# Patient Record
Sex: MALE | NOT HISPANIC OR LATINO | Employment: OTHER | URBAN - METROPOLITAN AREA
[De-identification: names, ages, dates, MRNs, and addresses within clinical notes are randomized per-mention and may not be internally consistent; named-entity substitution may affect disease eponyms.]

---

## 2018-11-01 ENCOUNTER — OCCUPATIONAL MEDICINE (OUTPATIENT)
Dept: URGENT CARE | Facility: PHYSICIAN GROUP | Age: 23
End: 2018-11-01
Payer: COMMERCIAL

## 2018-11-01 ENCOUNTER — HOSPITAL ENCOUNTER (OUTPATIENT)
Dept: RADIOLOGY | Facility: MEDICAL CENTER | Age: 23
End: 2018-11-01
Attending: PHYSICIAN ASSISTANT
Payer: COMMERCIAL

## 2018-11-01 VITALS
HEIGHT: 73 IN | BODY MASS INDEX: 29.82 KG/M2 | RESPIRATION RATE: 16 BRPM | HEART RATE: 84 BPM | OXYGEN SATURATION: 99 % | WEIGHT: 225 LBS | DIASTOLIC BLOOD PRESSURE: 80 MMHG | SYSTOLIC BLOOD PRESSURE: 120 MMHG | TEMPERATURE: 97.7 F

## 2018-11-01 DIAGNOSIS — S99.911A INJURY OF RIGHT ANKLE, INITIAL ENCOUNTER: ICD-10-CM

## 2018-11-01 DIAGNOSIS — S96.911A STRAIN OF RIGHT ANKLE, INITIAL ENCOUNTER: ICD-10-CM

## 2018-11-01 PROCEDURE — 73610 X-RAY EXAM OF ANKLE: CPT | Mod: RT

## 2018-11-01 PROCEDURE — 99203 OFFICE O/P NEW LOW 30 MIN: CPT | Mod: 29 | Performed by: PHYSICIAN ASSISTANT

## 2018-11-01 ASSESSMENT — ENCOUNTER SYMPTOMS
CHILLS: 0
TINGLING: 0
SENSORY CHANGE: 0
FOCAL WEAKNESS: 0
FEVER: 0

## 2018-11-01 ASSESSMENT — PAIN SCALES - GENERAL: PAINLEVEL: 6=MODERATE PAIN

## 2018-11-01 NOTE — PROGRESS NOTES
"Subjective:      Corey Ortiz is a 23 y.o. male who presents with Work-Related Injury (DOI 11/1/18, rolled right ankle)      DOI: 11/1/18. Patient was stepping out of his work truck while working and rolled his ankle after stepping on a rock. He suffered an inversion injury. He now complaints of moderate pain, severe with movement  Of the right ankle. He also has right ankle swelling and associated tingling and some numbness in his toes. He has history of right ankle strain about 4 years ago and a right ankle fracture over 10 years ago. He is unable to bear weight because of the pain. He has not taken anything for his symptoms.      HPI    History reviewed. No pertinent past medical history.    History reviewed. No pertinent surgical history.    History reviewed. No pertinent family history.    Allergies   Allergen Reactions   • Nkda [No Known Drug Allergy]        Medications, Allergies, and current problem list reviewed today in Epic    Review of Systems   Constitutional: Negative for chills, fever and malaise/fatigue.   Musculoskeletal: Positive for joint pain (right ankle pain ).   Skin: Negative for rash.        No laceration or abrasion   Neurological: Negative for tingling, sensory change and focal weakness.     All other systems reviewed and are negative.        Objective:     /80   Pulse 84   Temp 36.5 °C (97.7 °F) (Temporal)   Resp 16   Ht 1.854 m (6' 1\")   Wt 102.1 kg (225 lb)   SpO2 99%   BMI 29.69 kg/m²      Physical Exam   Constitutional: He is oriented to person, place, and time. He appears well-developed and well-nourished. No distress.   Eyes: Conjunctivae are normal.   Pulmonary/Chest: Effort normal. No respiratory distress.   Neurological: He is alert and oriented to person, place, and time. No cranial nerve deficit.   Psychiatric: He has a normal mood and affect. His behavior is normal. Judgment and thought content normal.       Vitals reviewed.  Right ankle: Limited ROM due to " pain. Mild TTP surrounding medial malleolus. Marked TTP over lateral malleolus and surrounding area. Moderate edema around lateral malleolus. Achilles tendon without pain and intact. Distal n/v intact. Capillary refill < 2 seconds.      11/1/2018 11:15 AM    HISTORY/REASON FOR EXAM: Injury today    TECHNIQUE/EXAM DESCRIPTION AND NUMBER OF VIEWS: 3 nonweightbearing views of the RIGHT ankle.    COMPARISON: None    FINDINGS:  There is severe lateral soft tissue swelling.    There is no acute displaced fracture. The ankle mortise is symmetric and there is no syndesmotic widening.   Impression       Severe lateral soft tissue swelling without acute displaced fracture detected.          Assessment/Plan:     1. Strain of right ankle, initial encounter    - DX-ANKLE 3+ VIEWS RIGHT; Future      Encouraged RICE, OTC NSAIDS.   CAM boot. Crutches. WBAT  RTC in 5 days.  Restrictions per D-39.     Differential diagnoses, Supportive care, and indications for immediate follow-up discussed with patient.   Instructed to return to clinic or nearest emergency department for any change in condition, further concerns, or worsening of symptoms.    The patient demonstrated a good understanding and agreed with the treatment plan.    Patricia Lincoln P.A.-C.

## 2018-11-01 NOTE — LETTER
[facility] [place]  [address] [facility] [place] [address]  Phone:  [phone] - Fax:  [phone]   [facility] Progress Report and Disability Certification  Date of Service: 11/1/2018   No Show:  No  Date / Time of Next Visit: 11/6/2018   Claim Information   Patient Name: Corey Ortiz  Claim Number:     Employer: [employer]  Date of Injury: 11/1/2018     Insurer / TPA: [employer]  ID / SSN:     Occupation: Oiler  Diagnosis: The encounter diagnosis was Strain of right ankle, initial encounter.    Medical Information   Related to Industrial Injury? Yes    Subjective Complaints:  DOI: 11/1/18. Patient was stepping out of his work truck while working and rolled his ankle after stepping on a rock. He suffered an inversion injury. He now complaints of moderate pain, severe with movement  Of the right ankle. He also has right ankle swelling and associated tingling and some numbness in his toes. He has history of right ankle strain about 4 years ago and a right ankle fracture over 10 years ago. He is unable to bear weight because of the pain. He has not taken anything for his symptoms.    Objective Findings: Vitals reviewed.  Right ankle: Limited ROM due to pain. Mild TTP surrounding medial malleolus. Marked TTP over lateral malleolus and surrounding area. Moderate edema around lateral malleolus. Achilles tendon without pain and intact. Distal n/v intact. Capillary refill < 2 seconds.   Pre-Existing Condition(s): Right ankle strain- 4 years ago. Right ankle fracture > 10 years ago.   Assessment:   Initial Visit    Status: Additional Care Required  Permanent Disability:No    Plan: Medication  Comments:RICE, OTC IBuprofen, Crutches, non-weight bearing, CAM boot    Diagnostics: X-ray  Comments:lateral soft tissue swelling, no acute fracture or dislocation    Comments:       Disability Information   Status: Released to Restricted Duty    From:  11/1/2018  Through: 11/6/2018  Restrictions are: Temporary   Physical Restrictions   Sitting:    Standing:    Stooping:    Bending:      Squatting:    Walking:    Climbing:    Pushing:      Pulling:    Other:    Reaching Above Shoulder (L):   Reaching Above Shoulder (R):       Reaching Below Shoulder (L):    Reaching Below Shoulder (R):      Not to exceed Weight Limits   Carrying(hrs):   Weight Limit(lb):   Lifting(hrs):   Weight  Limit(lb):     Comments: Patient shoulder be non-weight bearing of right leg.  Crutches and CAM boot while at work.     Repetitive Actions   Hands: i.e. Fine Manipulations from Grasping:     Feet: i.e. Operating Foot Controls: 0 hrs/day   Driving / Operate Machinery: 0 hrs/day   Physician Name: Kem Lincoln P.A.-C. Physician Signature: KEM Stapleton P.A.-C. e-Signature: Dr. Jeevan Uriarte, Medical Director   Clinic Name / Location: [address] [address] Clinic Phone Number: Dept: [phone]   Appointment Time: 10:15 Am Visit Start Time: 10:50 AM   Check-In Time:  10:44 Am Visit Discharge Time: 12.20 PM   Original-Treating Physician or Chiropractor    Page 2-Insurer/TPA    Page 3-Employer    Page 4-Employee

## 2018-11-01 NOTE — LETTER
"EMPLOYEE’S CLAIM FOR COMPENSATION/ REPORT OF INITIAL TREATMENT  FORM C-4    EMPLOYEE’S CLAIM - PROVIDE ALL INFORMATION REQUESTED   First Name  Corey Last Name  Angel Birthdate                    1995                Sex  male Claim Number   Home Address  [address] Age  23 y.o. 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 (if applicable)  climbing out of the truck    How did this injury or occupational disease occur? (Be specific an answer in detail. Use additional sheet if necessary)  I was getting out of the truck when my foot slipped and I fell landing on a rock that caused my ankle to roll   If you believe that you have an occupational disease, when did you first have knowledge of the disability and it relationship to your employment?  n/a Witnesses to the Accident  Ricardo Mathis      Nature of Injury or Occupational Disease  Sprain  Part(s) of Body Injured or Affected  Ankle (R), Ankle (R), Ankle (R)    I certify that the above is true and correct to the best of my knowledge and that I have provided this information in order " to obtain the benefits of [place]  I hereby authorize any physician, chiropractor, surgeon, practitioner, or other person, any hospital, including [facility] or [facility] hospital, any medical service organization, any insurance company, or other institution or organization to release to each other, any medical or other information, including benefits paid or payable, pertinent to this injury or disease, except information relative to diagnosis, treatment and/or counseling for AIDS, psychological conditions, alcohol or controlled substances, for which I must give specific authorization.  A Photostat of this authorization shall be as valid as the original.     Date   Place   Employee’s Signature   THIS REPORT MUST BE COMPLETED AND MAILED WITHIN 3 WORKING DAYS OF TREATMENT   Place  [facility]  Name of Facility  [place]   Date  11/1/2018 Diagnosis  (S96.911A) Strain of right ankle, initial encounter Is there evidence the injured employee was under the influence of alcohol and/or another controlled substance at the time of accident?   Hour  10:50 AM Description of Injury or Disease  The encounter diagnosis was Strain of right ankle, initial encounter. No   Treatment  Crutches, non-weight bearing right leg, CAM boot, RICE, OTC Ibuprofen, RTC in 5 days.   Have you advised the patient to remain off work five days or more? No   X-Ray Findings  Negative  Comments:lateral ankle swelling    If Yes   From Date  To Date      From information given by the employee, together with medical evidence, can you directly connect this injury or occupational disease as job incurred?  Yes If No Full Duty  No Modified Duty  Yes   Is additional medical care by a physician indicated?  Yes If Modified Duty, Specify any Limitations / Restrictions  See D-39 for restrictions    Do you know of any previous injury or  "disease contributing to this condition or occupational disease?                            Yes  Comments:right ankle strain 4 years ago. right ankle fracture > 10 years ago.   Date  11/1/2018 Print Doctor’s Name Kem Lincoln P.A.-C. I certify the employer’s copy of  this form was mailed on:   Address  [address] Insurer’s Use Only     [facility] Zip  [address]    Provider’s Tax ID Number  [id number] Telephone  Dept: [phone]        e-KEM Weiner P.A.-C.   e-Signature: Dr. Jeevan Uriarte, Medical Director Degree  PAJOCY        ORIGINAL-TREATING PHYSICIAN OR CHIROPRACTOR    PAGE 2-INSURER/TPA    PAGE 3-EMPLOYER    PAGE 4-EMPLOYEE             Form C-4 (rev10/07)              BRIEF DESCRIPTION OF RIGHTS AND BENEFITS  (Pursuant to [place]RS 616C.050)    Notice of Injury or Occupational Disease (Incident Report Form C-1): If an injury or occupational disease (OD) arises out of and in the  course of employment, you must provide written notice to your employer as soon as practicable, but no later than 7 days after the accident or  OD. Your employer shall maintain a sufficient supply of the required forms.    Claim for Compensation (Form C-4): If medical treatment is sought, the form C-4 is available at the place of initial treatment. A completed  \"Claim for Compensation\" (Form C-4) must be filed within 90 days after an accident or OD. The treating physician or chiropractor must,  within 3 working days after treatment, complete and mail to the employer, the employer's insurer and third-party , the Claim for  Compensation.    Medical Treatment: If you require medical treatment for your on-the-job injury or OD, you may be required to select a physician or  chiropractor from a list provided by your workers’ compensation insurer, if it has contracted with an Organization for Managed Care (MCO) or  Preferred Provider Organization (PPO) or providers of health care. If your employer has " not entered into a contract with an MCO or PPO, you  may select a physician or chiropractor from the Panel of Physicians and Chiropractors. Any medical costs related to your industrial injury or  OD will be paid by your insurer.    Temporary Total Disability (TTD): If your doctor has certified that you are unable to work for a period of at least 5 consecutive days, or 5  cumulative days in a 20-day period, or places restrictions on you that your employer does not accommodate, you may be entitled to TTD  compensation.    Temporary Partial Disability (TPD): If the wage you receive upon reemployment is less than the compensation for TTD to which you are  entitled, the insurer may be required to pay you TPD compensation to make up the difference. TPD can only be paid for a maximum of 24  months.    Permanent Partial Disability (PPD): When your medical condition is stable and there is an indication of a PPD as a result of your injury or  OD, within 30 days, your insurer must arrange for an evaluation by a rating physician or chiropractor to determine the degree of your PPD. The  amount of your PPD award depends on the date of injury, the results of the PPD evaluation and your age and wage.    Permanent Total Disability (PTD): If you are medically certified by a treating physician or chiropractor as permanently and totally disabled  and have been granted a PTD status by your insurer, you are entitled to receive monthly benefits not to exceed 66 2/3% of your average  monthly wage. The amount of your PTD payments is subject to reduction if you previously received a PPD award.    Vocational Rehabilitation Services: You may be eligible for vocational rehabilitation services if you are unable to return to the job due to a  permanent physical impairment or permanent restrictions as a result of your injury or occupational disease.    Transportation and Per Alyssia Reimbursement: You may be eligible for travel expenses and per alyssia  associated with medical treatment.    Reopening: You may be able to reopen your claim if your condition worsens after claim closure.    Appeal Process: If you disagree with a written determination issued by the insurer or the insurer does not respond to your request, you may  appeal to the Department of Administration, , by following the instructions contained in your determination letter. You must  appeal the determination within 70 days from the date of the determination letter at [address], [address], [place], [place]  [address], or [address], [address], [place], [place] [address]. If you disagree with the  decision, you may appeal to the  Department of Administration, . You must file your appeal within 30 days from the date of the  decision  letter at [address], [address], [place], [place] [address], or [address] [facility] [address], [place], [place] [address]. If you  disagree with a decision of an , you may file a petition for judicial review with the District Court. You must do so within 30  days of the Appeal Officer’s decision. You may be represented by an  at your own expense or you may contact the [facility] for possible  representation.    [place]  for Injured Workers (NAIW): If you disagree with a  decision, you may request that NAIW represent you  without charge at an  Hearing. For information regarding denial of benefits, you may contact the [facility] at: [address], [address], [place] [address], [phone], or [address], [address], [place] [address], [phone]    To File a Complaint with the Division: If you wish to file a complaint with the  of the Division of Industrial Relations (DIR),  please contact the Workers’ Compensation Section, [address], [address], [place], [place] [address], telephone  ([phone]) [phone], or  [address], [address], [place], [place] [address], telephone [phone]) [phone].    For assistance with Workers’ Compensation Issues: you may contact the Office of the Governor Consumer Health Assistance, [address] [place] [address], [address], [place], [place] [address], Toll Free 1-429.748.9033, Web site: http://govcha.[facility].nv., E-mail  Briana@[facility].nv.                                                                                                                                                                                                                                   __________________________________________________________________                                                                   _________________                Employee Name / Signature                                                                                                                                                       Date                                                                                                                                                                                                     D-2 (rev. 10/07)

## 2018-11-06 ENCOUNTER — OCCUPATIONAL MEDICINE (OUTPATIENT)
Dept: URGENT CARE | Facility: PHYSICIAN GROUP | Age: 23
End: 2018-11-06
Payer: COMMERCIAL

## 2018-11-06 VITALS
RESPIRATION RATE: 16 BRPM | HEART RATE: 89 BPM | TEMPERATURE: 98.8 F | WEIGHT: 225 LBS | OXYGEN SATURATION: 96 % | DIASTOLIC BLOOD PRESSURE: 92 MMHG | SYSTOLIC BLOOD PRESSURE: 140 MMHG | BODY MASS INDEX: 29.82 KG/M2 | HEIGHT: 73 IN

## 2018-11-06 DIAGNOSIS — S93.411D SPRAIN OF CALCANEOFIBULAR LIGAMENT OF RIGHT ANKLE, SUBSEQUENT ENCOUNTER: ICD-10-CM

## 2018-11-06 PROCEDURE — 99213 OFFICE O/P EST LOW 20 MIN: CPT | Performed by: EMERGENCY MEDICINE

## 2018-11-06 ASSESSMENT — ENCOUNTER SYMPTOMS
FALLS: 1
NERVOUS/ANXIOUS: 0
VOMITING: 0
CHILLS: 0
FEVER: 0
NAUSEA: 0
SENSORY CHANGE: 0
EYE REDNESS: 0
SPEECH CHANGE: 0
COUGH: 0
EYE DISCHARGE: 0

## 2018-11-06 NOTE — LETTER
[facility] [place]  [address] [facility] [place] [address]  Phone:  [phone] - Fax:  [phone]   [facility] Progress Report and Disability Certification  Date of Service: 11/6/2018   No Show:  No  Date / Time of Next Visit: 11/10/2018   Claim Information   Patient Name: Corey Ortiz  Claim Number:     Employer: [employer]  Date of Injury: 11/1/2018     Insurer / TPA: [employer]  ID / SSN:     Occupation: Oiler  Diagnosis: The encounter diagnosis was Sprain of calcaneofibular ligament of right ankle, subsequent encounter.    Medical Information   Related to Industrial Injury? Yes    Subjective Complaints:  Date of injury 11/1/2018.  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  Patient was initially seen that day in urgent care with soft tissue swelling on x-ray no fracture dislocation placed in a walking boot and doing limited work for the past 5 days.  States that he is approximately 15% improved.  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   Objective Findings: Patient's vital signs blood pressure 140/90, pulse of 89 and regular respiratory rate 16 with a temperature of 37.1 pulse oximetry of 96%.  Patient is currently walking with a walking boot and crutches.  Very tender over his calcaneal fibular ligament of his lateral ankle no proximal leg tenderness swelling appears to have diminished.   Pre-Existing Condition(s): Patient had an ankle strain 4 years ago and a right ankle fracture 10 years ago.   Assessment:   Condition Improved    Status: Additional Care Required  Permanent Disability:  Comments:Unknown    Plan:   Comments:Patient will continue crutch walking, light duty using anti-inflammatories as needed basis.  Warm soaks elevation will continue.    Diagnostics:   Comments:Previous x-rays soft tissue swelling only    Comments:        Disability Information   Status: Released to Restricted Duty    From:  2018  Through: 11/10/2018 Restrictions are: Temporary   Physical Restrictions   Sitting:    Standing:    Comments:Patient will continue crutch walking with walking boot in place on the right. Stooping:    Bending:      Squatting:    Walking:    Comments:Patient will be crutch walking with partial weightbearing on the right ankle/foot. Climbin hrs/day Pushing:      Pulling:    Other:    Reaching Above Shoulder (L):   Reaching Above Shoulder (R):       Reaching Below Shoulder (L):    Reaching Below Shoulder (R):      Not to exceed Weight Limits   Carrying(hrs): 2 Weight Limit(lb): < or = to 10 pounds  Comments:may use a tote bag / back pack with crutches  Lifting(hrs): 4 Weight  Limit(lb): < or = to 10 pounds   Comments: Patient will continue to advance crutch walking with partial weightbearing.  To be reevaluated in 4 days.    Repetitive Actions   Hands: i.e. Fine Manipulations from Grasping:     Feet: i.e. Operating Foot Controls:     Driving / Operate Machinery:     Physician Name: Nathan Coreas M.D. Physician Signature:   e-Signature: Dr. Jeevan Uriarte, Medical Director   Clinic Name / Location: [address] [address] Clinic Phone Number: Dept: [phone]   Appointment Time: 8:40 Am Visit Start Time: 8:22 AM   Check-In Time:  8:15 Am Visit Discharge Time:  9:15 AM    Original-Treating Physician or Chiropractor    Page 2-Insurer/TPA    Page 3-Employer    Page 4-Employee

## 2018-11-06 NOTE — PROGRESS NOTES
Subjective:      Corey Ortiz is a 23 y.o. male who presents with Work-Related Injury (Fv R ankle injury; DOI 11/1/2018)      Date of injury 11/1/2018.  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  Patient was initially seen that day in urgent care with soft tissue swelling on x-ray no fracture dislocation placed in a walking boot and doing limited work for the past 5 days.  States that he is approximately 15% improved.  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     HPI  PMH:  has no past medical history of Diabetes.  MEDS:   Current Outpatient Prescriptions:   •  Pseudoephedrine-APAP-DM (DAYQUIL PO), Take  by mouth., Disp: , Rfl:   •  azithromycin (ZITHROMAX) 250 MG TABS, 2 tabs by mouth day 1, 1 tab by mouth days 2-5 (Patient not taking: Reported on 11/6/2018), Disp: 6 Tab, Rfl: 0  •  hydrocod polst-chlorphen polst (TUSSIONEX) 10-8 MG/5ML LQCR, Take 5 mL by mouth every 12 hours. (Patient not taking: Reported on 11/6/2018), Disp: 140 mL, Rfl: 0  •  hydrocodone-acetaminophen (VICODIN) 5-500 MG TABS, Take 1-2 Tabs by mouth every 6 hours as needed., Disp: 15 Each, Rfl: 0  •  mupirocin (BACTROBAN) 2 % OINT, Apply 1 Application to affected area(s) 2 times a day., Disp: 1 Tube, Rfl: 0  ALLERGIES:   Allergies   Allergen Reactions   • Nkda [No Known Drug Allergy]      SURGHX: No past surgical history on file.  SOCHX:  reports that he has never smoked. He has never used smokeless tobacco. He reports that he does not drink alcohol or use drugs.  FH: family history is not on file.  Review of Systems   Constitutional: Negative for chills and fever.   Eyes: Negative for discharge and redness.   Respiratory: Negative for cough.    Cardiovascular: Negative for chest pain.   Gastrointestinal: Negative for nausea and vomiting.   Musculoskeletal: Positive for falls and joint pain.   Skin: Negative for  "rash.   Neurological: Negative for sensory change and speech change.   Psychiatric/Behavioral: The patient is not nervous/anxious.           Objective:     /92   Pulse 89   Temp 37.1 °C (98.8 °F) (Temporal)   Resp 16   Ht 1.854 m (6' 1\")   Wt 102.1 kg (225 lb)   SpO2 96%   BMI 29.69 kg/m²      Physical Exam    Patient's vital signs blood pressure 140/90, pulse of 89 and regular respiratory rate 16 with a temperature of 37.1 pulse oximetry of 96%.  Patient is currently walking with a walking boot and crutches.  Very tender over his calcaneal fibular ligament of his lateral ankle no proximal leg tenderness swelling appears to have diminished.       Assessment/Plan:     1. Sprain of calcaneofibular ligament of right ankle, subsequent encounter      Please refer to the D-39 form      "

## 2018-11-10 ENCOUNTER — OCCUPATIONAL MEDICINE (OUTPATIENT)
Dept: URGENT CARE | Facility: PHYSICIAN GROUP | Age: 23
End: 2018-11-10
Payer: COMMERCIAL

## 2018-11-10 VITALS
SYSTOLIC BLOOD PRESSURE: 120 MMHG | TEMPERATURE: 98.8 F | HEART RATE: 81 BPM | BODY MASS INDEX: 29.82 KG/M2 | DIASTOLIC BLOOD PRESSURE: 68 MMHG | WEIGHT: 225 LBS | HEIGHT: 73 IN | OXYGEN SATURATION: 96 %

## 2018-11-10 DIAGNOSIS — S93.401D SPRAIN OF RIGHT ANKLE, UNSPECIFIED LIGAMENT, SUBSEQUENT ENCOUNTER: ICD-10-CM

## 2018-11-10 PROCEDURE — 99213 OFFICE O/P EST LOW 20 MIN: CPT | Mod: 29 | Performed by: PHYSICIAN ASSISTANT

## 2018-11-10 NOTE — PROGRESS NOTES
"Subjective:      Corey Ortiz is a 23 y.o. male who presents with Work-Related Injury (W/C FV// R ankle// sore feeling// starting walking yesterday with just the boot// )      DOI: 11/1/18. Patient was stepping out of his work truck while working and rolled his ankle after stepping on a rock. He suffered an inversion injury.  He returns to clinic stating he has had approximately 70% improvement.  He states he has been walking without crutches but still using the walking boot.  He feels he could increase his weight restrictions to 25 pounds.  He has been working on light/office duty at work.  He has history of right ankle strain about 4 years ago and a right ankle fracture over 10 years ago.      HPI    ROS       Objective:     /68 (BP Location: Left arm, Patient Position: Sitting)   Pulse 81   Temp 37.1 °C (98.8 °F) (Temporal)   Ht 1.854 m (6' 1\")   Wt 102.1 kg (225 lb)   SpO2 96%   BMI 29.69 kg/m²      Physical Exam    Gen: AOx3; Head: NC AT; Eyes: PERRLA/EOM; Lungs: NLR; Cardiac: RR by periph pulse exam; right ankle: Mild but improving ankle effusion, no erythema, trace ecchymosis and dependent position medially and laterally, mild tenderness to palpation about medial as well as lateral malleoli, limited active range of motion in flexion and extension secondary to pain and swelling, stable with varus and valgus stress; neuro: N VID normal sensation to light touch brisk capillary refill, antalgic gait (with boot on)       xrays NEG for fx (prior visit)    Assessment/Plan:     1. Sprain of right ankle, unspecified ligament, subsequent encounter  25 pound weight carrying restriction, weightbearing in boot without crutches while at work, OTC NSAIDs, ice elevation, work on range of motion while elevating, follow-up in 1 week      "

## 2018-11-10 NOTE — LETTER
[facility] [place] [address] [address] [facility] [place] [address]  Phone:  [phone] - Fax:  [phone]   [facility] Progress Report and Disability Certification  Date of Service: 11/10/2018   No Show:  No  Date / Time of Next Visit: 11/17/2018   Claim Information   Patient Name: Corey Ortiz  Claim Number:     Employer: [employer]  Date of Injury: 11/1/2018     Insurer / TPA: [employer]  ID / SSN:     Occupation: Oiler  Diagnosis: The encounter diagnosis was Sprain of right ankle, unspecified ligament, subsequent encounter.    Medical Information   Related to Industrial Injury? Yes    Subjective Complaints:  DOI: 11/1/18. Patient was stepping out of his work truck while working and rolled his ankle after stepping on a rock. He suffered an inversion injury.  He returns to clinic stating he has had approximately 70% improvement.  He states he has been walking without crutches but still using the walking boot.  He feels he could increase his weight restrictions to 25 pounds.  He has been working on light/office duty at work.  He has history of right ankle strain about 4 years ago and a right ankle fracture over 10 years ago.    Objective Findings: Gen: AOx3; Head: NC AT; Eyes: PERRLA/EOM; Lungs: NLR; Cardiac: RR by periph pulse exam; right ankle: Mild but improving ankle effusion, no erythema, trace ecchymosis and dependent position medially and laterally, mild tenderness to palpation about medial as well as lateral malleoli, limited active range of motion in flexion and extension secondary to pain and swelling, stable with varus and valgus stress; neuro: N VID normal sensation to light touch brisk capillary refill, antalgic gait (with boot on)     Pre-Existing Condition(s):     Assessment:   Condition Improved    Status: Additional Care Required  Permanent Disability:No    Plan:   Comments:25 pound weight carrying restriction, weightbearing in boot without crutches while  at work, OTC NSAIDs, ice elevation, work on range of motion while elevating, follow-up in 1 week     Diagnostics: X-ray  Comments:NEG for fx    Comments:       Disability Information   Status: Released to Restricted Duty    From:  11/10/2018  Through: 11/17/2018 Restrictions are: Temporary   Physical Restrictions   Sitting:    Standing:    Stooping:    Bending:      Squatting:    Walking:  < or = to 2 hrs/day Climbing:    Pushing:      Pulling:    Other:    Reaching Above Shoulder (L):   Reaching Above Shoulder (R):       Reaching Below Shoulder (L):    Reaching Below Shoulder (R):      Not to exceed Weight Limits   Carrying(hrs):   Weight Limit(lb): < or = to 25 pounds Lifting(hrs):   Weight  Limit(lb): < or = to 25 pounds   Comments: 25 pound weight carrying restriction, weightbearing in boot without crutches while at work, OTC NSAIDs, ice elevation, work on range of motion while elevating, follow-up in 1 week    Repetitive Actions   Hands: i.e. Fine Manipulations from Grasping:     Feet: i.e. Operating Foot Controls:     Driving / Operate Machinery:     Physician Name: Osmar Cantrell P.A.-C. Physician Signature: OSMAR Guerrero P.A.-C. e-Signature: Dr. Jeevan Uriarte, Medical Director   Clinic Name / Location: [address] [address] Clinic Phone Number: Dept: [phone]   Appointment Time: 9:00 Am Visit Start Time: 9:05 AM   Check-In Time:  8:58 Am Visit Discharge Time:  9:50 AM   Original-Treating Physician or Chiropractor    Page 2-Insurer/TPA    Page 3-Employer    Page 4-Employee

## 2018-11-17 ENCOUNTER — OCCUPATIONAL MEDICINE (OUTPATIENT)
Dept: URGENT CARE | Facility: PHYSICIAN GROUP | Age: 23
End: 2018-11-17
Payer: COMMERCIAL

## 2018-11-17 VITALS
WEIGHT: 225 LBS | DIASTOLIC BLOOD PRESSURE: 80 MMHG | HEART RATE: 96 BPM | RESPIRATION RATE: 14 BRPM | HEIGHT: 73 IN | BODY MASS INDEX: 29.82 KG/M2 | TEMPERATURE: 98.8 F | OXYGEN SATURATION: 95 % | SYSTOLIC BLOOD PRESSURE: 122 MMHG

## 2018-11-17 DIAGNOSIS — S93.401D SPRAIN OF RIGHT ANKLE, UNSPECIFIED LIGAMENT, SUBSEQUENT ENCOUNTER: ICD-10-CM

## 2018-11-17 PROCEDURE — 99213 OFFICE O/P EST LOW 20 MIN: CPT | Mod: 29 | Performed by: PHYSICIAN ASSISTANT

## 2018-11-17 ASSESSMENT — ENCOUNTER SYMPTOMS
FEVER: 0
SHORTNESS OF BREATH: 0
DIZZINESS: 0
CHILLS: 0
ABDOMINAL PAIN: 0
DIARRHEA: 0
VOMITING: 0
NAUSEA: 0

## 2018-11-17 NOTE — LETTER
[facility] [place] [address] [address] [facility] [place] [address]  Phone:  [phone] - Fax:  [phone]   [facility] Progress Report and Disability Certification  Date of Service: 11/17/2018   No Show:  No  Date / Time of Next Visit: 11/24/2018   Claim Information   Patient Name: Corey Ortiz  Claim Number:     Employer: [employer]  Date of Injury: 11/1/2018     Insurer / TPA:  [employer] Claims ID / SSN:     Occupation: Oiler  Diagnosis: The encounter diagnosis was Sprain of right ankle, unspecified ligament, subsequent encounter.    Medical Information   Related to Industrial Injury? Yes    Subjective Complaints:   DOI: 11/1/18. Patient was stepping out of his work truck while working and rolled his ankle after stepping on a rock. He suffered an inversion injury.  He returns to clinic reporting improvement in the pain and ROM. Only slight residual swelling of the area without ecchymosis.  He states he has been walking without crutches but still using the walking boot.  He has been working on light/office duty at work.  He has history of right ankle strain about 4 years ago and a right ankle fracture over 10 years ago.  He is no longer taking any OTC medications for the pain.   Objective Findings: Musculoskeletal: Normal range of motion.        Left ankle: He exhibits swelling. He exhibits normal range of motion and no ecchymosis. No tenderness. No lateral malleolus, no medial malleolus and no head of 5th metatarsal tenderness found.   Very slight edema over anterior/lateral aspect of right ankle. No erythema or ecchymosis noted.     Pre-Existing Condition(s): Prior right ankle sprain, prior right ankle fracture   Assessment:   Condition Improved    Status: Additional Care Required  Permanent Disability:No    Plan:      Diagnostics: X-ray  Comments:negative    Comments:       Disability Information   Status: Released to Full Duty    From:  11/17/2018  Through: 11/24/2018  Restrictions are:     Physical Restrictions   Sitting:    Standing:    Stooping:    Bending:      Squatting:    Walking:    Climbing:    Pushing:      Pulling:    Other:    Reaching Above Shoulder (L):   Reaching Above Shoulder (R):       Reaching Below Shoulder (L):    Reaching Below Shoulder (R):      Not to exceed Weight Limits   Carrying(hrs):   Weight Limit(lb):   Lifting(hrs):   Weight  Limit(lb):     Comments: Trial of full duty  Encouraged to wear ankle brace under steel toe boots while at work  RTC in 1 week for follow up, expect discharge at that time    Repetitive Actions   Hands: i.e. Fine Manipulations from Grasping:     Feet: i.e. Operating Foot Controls:     Driving / Operate Machinery:     Physician Name: Melissa Kinney P.A.-C. Physician Signature: MELISSA Santana P.A.-C. e-Signature: Dr. Jeevan Uriarte, Medical Director   Clinic Name / Location: [address] [address] Clinic Phone Number: Dept: [phone]   Appointment Time: 9:20 Am Visit Start Time: 9:12 AM   Check-In Time:  9:05 Am Visit Discharge Time:  10:08 AM   Original-Treating Physician or Chiropractor    Page 2-Insurer/TPA    Page 3-Employer    Page 4-Employee

## 2018-11-17 NOTE — PROGRESS NOTES
"Subjective:      Corey Ortiz is a 23 y.o. male who presents with Follow-Up (wc fv on right ankle is getting better )       DOI: 11/1/18. Patient was stepping out of his work truck while working and rolled his ankle after stepping on a rock. He suffered an inversion injury.  He returns to clinic reporting improvement in the pain and ROM. Only slight residual swelling of the area without ecchymosis.  He states he has been walking without crutches but still using the walking boot.  He has been working on light/office duty at work.  He has history of right ankle strain about 4 years ago and a right ankle fracture over 10 years ago.  He is no longer taking any OTC medications for the pain.     HPI    Review of Systems   Constitutional: Negative for chills and fever.   HENT: Negative for congestion.    Respiratory: Negative for shortness of breath.    Cardiovascular: Negative for chest pain.   Gastrointestinal: Negative for abdominal pain, diarrhea, nausea and vomiting.   Genitourinary: Negative.    Musculoskeletal:        + right ankle pain   Skin: Negative for rash.   Neurological: Negative for dizziness.          Objective:     /80   Pulse 96   Temp 37.1 °C (98.8 °F) (Temporal)   Resp 14   Ht 1.854 m (6' 1\")   Wt 102.1 kg (225 lb)   SpO2 95%   BMI 29.69 kg/m²      Physical Exam   Constitutional: He is oriented to person, place, and time. He appears well-developed and well-nourished. No distress.   HENT:   Head: Normocephalic and atraumatic.   Eyes: Pupils are equal, round, and reactive to light.   Neck: Normal range of motion.   Cardiovascular: Normal rate.    Pulmonary/Chest: Effort normal.   Musculoskeletal: Normal range of motion.        Left ankle: He exhibits swelling. He exhibits normal range of motion and no ecchymosis. No tenderness. No lateral malleolus, no medial malleolus and no head of 5th metatarsal tenderness found.   Very slight edema over anterior/lateral aspect of right ankle. No " erythema or ecchymosis noted.    Neurological: He is alert and oriented to person, place, and time.   Skin: Skin is warm and dry. He is not diaphoretic.   Psychiatric: He has a normal mood and affect. His behavior is normal.   Nursing note and vitals reviewed.              PMH:  has no past medical history of Diabetes.  MEDS:   Current Outpatient Prescriptions:   •  Pseudoephedrine-APAP-DM (DAYQUIL PO), Take  by mouth., Disp: , Rfl:   •  azithromycin (ZITHROMAX) 250 MG TABS, 2 tabs by mouth day 1, 1 tab by mouth days 2-5 (Patient not taking: Reported on 11/6/2018), Disp: 6 Tab, Rfl: 0  •  hydrocod polst-chlorphen polst (TUSSIONEX) 10-8 MG/5ML LQCR, Take 5 mL by mouth every 12 hours. (Patient not taking: Reported on 11/6/2018), Disp: 140 mL, Rfl: 0  •  hydrocodone-acetaminophen (VICODIN) 5-500 MG TABS, Take 1-2 Tabs by mouth every 6 hours as needed., Disp: 15 Each, Rfl: 0  •  mupirocin (BACTROBAN) 2 % OINT, Apply 1 Application to affected area(s) 2 times a day., Disp: 1 Tube, Rfl: 0  ALLERGIES:   Allergies   Allergen Reactions   • Nkda [No Known Drug Allergy]      SURGHX: History reviewed. No pertinent surgical history.  SOCHX:  reports that he has never smoked. He has never used smokeless tobacco. He reports that he does not drink alcohol or use drugs.  FH: family history is not on file.    Assessment/Plan:     1. Sprain of right ankle, unspecified ligament, subsequent encounter    Trial of full duty  Encouraged to wear ankle brace under steel toe boots while at work  RTC in 1 week for follow up, expect discharge at that time

## 2019-11-05 ENCOUNTER — OFFICE VISIT (OUTPATIENT)
Dept: URGENT CARE | Facility: CLINIC | Age: 24
End: 2019-11-05

## 2019-11-05 VITALS
HEIGHT: 73 IN | BODY MASS INDEX: 30.75 KG/M2 | OXYGEN SATURATION: 96 % | WEIGHT: 232 LBS | TEMPERATURE: 98.9 F | RESPIRATION RATE: 16 BRPM | HEART RATE: 101 BPM | SYSTOLIC BLOOD PRESSURE: 118 MMHG | DIASTOLIC BLOOD PRESSURE: 78 MMHG

## 2019-11-05 DIAGNOSIS — J22 LRTI (LOWER RESPIRATORY TRACT INFECTION): ICD-10-CM

## 2019-11-05 DIAGNOSIS — R05.9 COUGH: ICD-10-CM

## 2019-11-05 DIAGNOSIS — J98.01 BRONCHOSPASM: ICD-10-CM

## 2019-11-05 PROCEDURE — 99214 OFFICE O/P EST MOD 30 MIN: CPT | Performed by: PHYSICIAN ASSISTANT

## 2019-11-05 RX ORDER — BENZONATATE 100 MG/1
100 CAPSULE ORAL 3 TIMES DAILY PRN
Qty: 60 CAP | Refills: 0 | Status: SHIPPED | OUTPATIENT
Start: 2019-11-05 | End: 2023-02-01

## 2019-11-05 RX ORDER — AZITHROMYCIN 250 MG/1
TABLET, FILM COATED ORAL
Qty: 6 TAB | Refills: 0 | Status: SHIPPED | OUTPATIENT
Start: 2019-11-05 | End: 2023-02-01

## 2019-11-05 RX ORDER — METHYLPREDNISOLONE 4 MG/1
TABLET ORAL
Qty: 21 TAB | Refills: 0 | Status: SHIPPED | OUTPATIENT
Start: 2019-11-05 | End: 2023-02-01

## 2019-11-05 ASSESSMENT — ENCOUNTER SYMPTOMS
FEVER: 0
SHORTNESS OF BREATH: 0
COUGH: 1
DIARRHEA: 0
MYALGIAS: 1
CHILLS: 1
WHEEZING: 1
SPUTUM PRODUCTION: 0
SORE THROAT: 0
ABDOMINAL PAIN: 0
VOMITING: 0
NAUSEA: 0

## 2019-11-05 NOTE — LETTER
November 5, 2019       Patient: Corey Ortiz   YOB: 1995   Date of Visit: 11/5/2019         To Whom It May Concern:    It is my medical opinion that Corey Ortiz should be excused from work for tomorrow due to illness.      If you have any questions or concerns, please don't hesitate to call [phone]          Sincerely,          Osmar Cantrell P.A.-C.  Electronically Signed

## 2019-11-06 NOTE — PROGRESS NOTES
"Subjective:   Corey Ortiz  is a 24 y.o. male who presents for Cough (x3days, cough, chest congestion, sob when walking, fatigue, body aches)        Cough   This is a new problem. The current episode started in the past 7 days. Associated symptoms include chills, myalgias and wheezing. Pertinent negatives include no ear pain, fever, rash, sore throat or shortness of breath. His past medical history is significant for environmental allergies.     Patient comes clinic describing last 3 to 4 days of cough is worsening more chest congestion at this time.  He notes cough is very spastic at this time has had a few episodes of near posttussive emesis.  Denies noting fever but has had body aches and chills suspects a subjective fever.  Denies ear pain or sore throat but notes some sinus pressure.  Notes wheezy coughing from his chest that worsens with activity.  He notes past medical history of bronchitis he was told he was \"nearly pneumonia\".  Has tried over-the-counter cough suppressants which did allow him to sleep adequately the other night.  Denies past medical history of asthma but has heard wheezing.  Denies treatment for seasonal allergies but notes some presence of them in the past.    Review of Systems   Constitutional: Positive for chills. Negative for fever.   HENT: Positive for congestion. Negative for ear pain and sore throat.    Respiratory: Positive for cough and wheezing. Negative for sputum production and shortness of breath.    Gastrointestinal: Negative for abdominal pain, diarrhea, nausea and vomiting.   Musculoskeletal: Positive for myalgias.   Skin: Negative for rash.   Endo/Heme/Allergies: Positive for environmental allergies.     Allergies   Allergen Reactions   • Nkda [No Known Drug Allergy]       Objective:   /78 (BP Location: Left arm, Patient Position: Sitting, BP Cuff Size: Adult)   Pulse (!) 101   Temp 37.2 °C (98.9 °F) (Temporal)   Resp 16   Ht 1.854 m (6' 1\")   Wt 105.2 kg (232 " lb)   SpO2 96%   BMI 30.61 kg/m²   Physical Exam  Vitals signs and nursing note reviewed.   Constitutional:       General: He is not in acute distress.     Appearance: He is well-developed. He is not diaphoretic.   HENT:      Head: Normocephalic and atraumatic.      Right Ear: Tympanic membrane, ear canal and external ear normal.      Left Ear: Tympanic membrane, ear canal and external ear normal.      Nose: Nose normal.      Mouth/Throat:      Pharynx: Uvula midline. Posterior oropharyngeal erythema ( mild PND) present. No oropharyngeal exudate.      Tonsils: No tonsillar abscesses.   Eyes:      General: No scleral icterus.        Right eye: No discharge.         Left eye: No discharge.      Conjunctiva/sclera: Conjunctivae normal.   Neck:      Musculoskeletal: Neck supple.   Pulmonary:      Effort: Pulmonary effort is normal. No accessory muscle usage or respiratory distress.      Breath sounds: Wheezing and rhonchi (mild ) present. No decreased breath sounds or rales.   Musculoskeletal: Normal range of motion.   Lymphadenopathy:      Cervical: Cervical adenopathy ( mild bilat) present.   Skin:     General: Skin is warm and dry.      Coloration: Skin is not pale.   Neurological:      Mental Status: He is alert and oriented to person, place, and time.      Coordination: Coordination normal.           Assessment/Plan:   1. LRTI (lower respiratory tract infection)  - azithromycin (ZITHROMAX) 250 MG Tab; Take as directed on package. Dispense one package.  Dispense: 6 Tab; Refill: 0    2. Cough  - benzonatate (TESSALON) 100 MG Cap; Take 1 Cap by mouth 3 times a day as needed for Cough.  Dispense: 60 Cap; Refill: 0    3. Bronchospasm  - methylPREDNISolone (MEDROL DOSEPAK) 4 MG Tablet Therapy Pack; Follow schedule on package instructions.  Dispense: 21 Tab; Refill: 0  Supportive care is reviewed with patient/caregiver - recommend to push PO fluids and electrolytes, Nsaids/tylenol, netti pot/saline irrig, humidifier in  home, flonase, ponaris, antihistamine, Cautioned regarding potential side effects of steroid, avoid nsaids while using   take full course of Rx, take with probiotics, observe for resolution  Return to clinic with lack of resolution or progression of symptoms.    Differential diagnosis, natural history, supportive care, and indications for immediate follow-up discussed.

## 2020-05-20 ENCOUNTER — HOSPITAL ENCOUNTER (OUTPATIENT)
Facility: MEDICAL CENTER | Age: 25
End: 2020-05-20
Payer: COMMERCIAL

## 2020-05-23 LAB
SARS-COV-2 RNA SPEC QL NAA+PROBE: NOT DETECTED
SPECIMEN SOURCE: NORMAL

## 2021-02-23 ENCOUNTER — HOSPITAL ENCOUNTER (EMERGENCY)
Dept: HOSPITAL 8 - ED | Age: 26
End: 2021-02-23
Payer: COMMERCIAL

## 2021-02-23 VITALS — SYSTOLIC BLOOD PRESSURE: 131 MMHG | DIASTOLIC BLOOD PRESSURE: 78 MMHG

## 2021-02-23 VITALS — HEIGHT: 73 IN | WEIGHT: 230.38 LBS | BODY MASS INDEX: 30.53 KG/M2

## 2021-02-23 DIAGNOSIS — R55: Primary | ICD-10-CM

## 2021-02-23 DIAGNOSIS — R94.31: ICD-10-CM

## 2021-02-23 LAB
ANION GAP SERPL CALC-SCNC: 9 MMOL/L (ref 5–15)
BASOPHILS # BLD AUTO: 0.1 X10^3/UL (ref 0–0.1)
BASOPHILS NFR BLD AUTO: 1 % (ref 0–1)
CALCIUM SERPL-MCNC: 9.2 MG/DL (ref 8.5–10.1)
CHLORIDE SERPL-SCNC: 111 MMOL/L (ref 98–107)
CREAT SERPL-MCNC: 0.99 MG/DL (ref 0.7–1.3)
EOSINOPHIL # BLD AUTO: 0.6 X10^3/UL (ref 0–0.4)
EOSINOPHIL NFR BLD AUTO: 5 % (ref 1–7)
ERYTHROCYTE [DISTWIDTH] IN BLOOD BY AUTOMATED COUNT: 12.8 % (ref 9.4–14.8)
LYMPHOCYTES # BLD AUTO: 2.8 X10^3/UL (ref 1–3.4)
LYMPHOCYTES NFR BLD AUTO: 25 % (ref 22–44)
MCH RBC QN AUTO: 31.9 PG (ref 27.5–34.5)
MCHC RBC AUTO-ENTMCNC: 36 G/DL (ref 33.2–36.2)
MD: (no result)
MONOCYTES # BLD AUTO: 0.8 X10^3/UL (ref 0.2–0.8)
MONOCYTES NFR BLD AUTO: 7 % (ref 2–9)
NEUTROPHILS # BLD AUTO: 7.2 X10^3/UL (ref 1.8–6.8)
NEUTROPHILS NFR BLD AUTO: 62 % (ref 42–75)
PLATELET # BLD AUTO: 403 X10^3/UL (ref 130–400)
PMV BLD AUTO: 7.4 FL (ref 7.4–10.4)
RBC # BLD AUTO: 5.17 X10^6/UL (ref 4.38–5.82)

## 2021-02-23 PROCEDURE — 36415 COLL VENOUS BLD VENIPUNCTURE: CPT

## 2021-02-23 PROCEDURE — 80320 DRUG SCREEN QUANTALCOHOLS: CPT

## 2021-02-23 PROCEDURE — 93005 ELECTROCARDIOGRAM TRACING: CPT

## 2021-02-23 PROCEDURE — 85025 COMPLETE CBC W/AUTO DIFF WBC: CPT

## 2021-02-23 PROCEDURE — G0480 DRUG TEST DEF 1-7 CLASSES: HCPCS

## 2021-02-23 PROCEDURE — 99285 EMERGENCY DEPT VISIT HI MDM: CPT

## 2021-02-23 PROCEDURE — 80048 BASIC METABOLIC PNL TOTAL CA: CPT

## 2021-02-23 PROCEDURE — 83735 ASSAY OF MAGNESIUM: CPT

## 2021-02-23 PROCEDURE — 80307 DRUG TEST PRSMV CHEM ANLYZR: CPT

## 2021-02-23 NOTE — NUR
BREAK RN: PT OOB AND AMBULATED TO BATHROOM, UPRIGHT STEADY GAIT.  URNIE SAMPLE 
COLLECTED AND SENT TO LAB.  PT RTD TO ROOM W/O INCIDENT.  ALL MONITORS PLACED 
AND CALL LIGHT W/I REACH

## 2021-02-23 NOTE — NUR
PT REC'VD DISCHARGE INSTRUCTIONS AND EDUCATION. PT AND SPOUSE HAD NO QUESTIONS. 
PT AMBULATED TO DC AREA, STEADY GAIT

## 2023-02-01 ENCOUNTER — OFFICE VISIT (OUTPATIENT)
Dept: URGENT CARE | Facility: PHYSICIAN GROUP | Age: 28
End: 2023-02-01
Payer: COMMERCIAL

## 2023-02-01 ENCOUNTER — HOSPITAL ENCOUNTER (OUTPATIENT)
Dept: RADIOLOGY | Facility: MEDICAL CENTER | Age: 28
End: 2023-02-01
Attending: PHYSICIAN ASSISTANT
Payer: COMMERCIAL

## 2023-02-01 VITALS
RESPIRATION RATE: 16 BRPM | SYSTOLIC BLOOD PRESSURE: 100 MMHG | HEIGHT: 73 IN | WEIGHT: 239 LBS | OXYGEN SATURATION: 96 % | TEMPERATURE: 98.2 F | DIASTOLIC BLOOD PRESSURE: 62 MMHG | BODY MASS INDEX: 31.68 KG/M2 | HEART RATE: 83 BPM

## 2023-02-01 DIAGNOSIS — S30.0XXA COCCYX CONTUSION, INITIAL ENCOUNTER: ICD-10-CM

## 2023-02-01 DIAGNOSIS — S39.012A LUMBAR STRAIN, INITIAL ENCOUNTER: ICD-10-CM
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
## 2023-02-01 PROCEDURE — 99203 OFFICE O/P NEW LOW 30 MIN: CPT | Performed by: PHYSICIAN ASSISTANT

## 2023-02-01 PROCEDURE — 72220 X-RAY EXAM SACRUM TAILBONE: CPT

## 2023-02-01 RX ORDER — IBUPROFEN 800 MG/1
800 TABLET ORAL EVERY 8 HOURS PRN
Qty: 30 TABLET | Refills: 0 | Status: SHIPPED | OUTPATIENT
Start: 2023-02-01

## 2023-02-01 RX ORDER — CYCLOBENZAPRINE HCL 10 MG
10 TABLET ORAL 3 TIMES DAILY PRN
Qty: 30 TABLET | Refills: 0 | Status: SHIPPED | OUTPATIENT
Start: 2023-02-01

## 2023-02-01 ASSESSMENT — ENCOUNTER SYMPTOMS
BRUISES/BLEEDS EASILY: 0
BACK PAIN: 1
LOSS OF CONSCIOUSNESS: 0
HEADACHES: 0
WEAKNESS: 0
FALLS: 1
MYALGIAS: 1
TINGLING: 0
DIZZINESS: 0

## 2023-02-01 NOTE — PROGRESS NOTES
Subjective:     CHIEF COMPLAINT  Chief Complaint   Patient presents with    Tailbone Pain     Slipped and fell on  ice yesterday , now having tail bone pain        HPI  Laz Ortiz is a very pleasant 28 y.o. male who presents to clinic after sustaining a ground-level fall yesterday.  Patient was carrying boxes downstairs when he slipped on ice falling onto his tailbone.  He has been experiencing pain since this event.  Pain is predominantly over the area of the coccyx.  Pain is aggravated with bending and squatting.  Denies any bruising or discoloration.  Denies any radicular symptoms to lower extremities.  No paresthesias or weakness to lower extremities.  Normal bowel and bladder function.  No saddle anesthesia.  He took Tylenol yesterday with mild relief.    REVIEW OF SYSTEMS  Review of Systems   Genitourinary:         No change in bowel or bladder function.   Musculoskeletal:  Positive for back pain, falls and myalgias.   Neurological:  Negative for dizziness, tingling, loss of consciousness, weakness and headaches.   Endo/Heme/Allergies:  Does not bruise/bleed easily.     PAST MEDICAL HISTORY  Patient Active Problem List    Diagnosis Date Noted    Impetigo 10/02/2010    Otitis externa 10/02/2010       SURGICAL HISTORY  patient denies any surgical history    ALLERGIES  Allergies   Allergen Reactions    Nkda [No Known Drug Allergy]        CURRENT MEDICATIONS  Home Medications       Reviewed by Tay Maloney P.A.-C. (Physician Assistant) on 02/01/23 at 0908  Med List Status: <None>     Medication Last Dose Status   azithromycin (ZITHROMAX) 250 MG Tab Not Taking Active   azithromycin (ZITHROMAX) 250 MG TABS Not Taking Active   benzonatate (TESSALON) 100 MG Cap Not Taking Active   hydrocod polst-chlorphen polst (TUSSIONEX) 10-8 MG/5ML LQCR Not Taking Active   hydrocodone-acetaminophen (VICODIN) 5-500 MG TABS Not Taking Active   methylPREDNISolone (MEDROL DOSEPAK) 4 MG Tablet Therapy Pack Not Taking Active  "  mupirocin (BACTROBAN) 2 % OINT Not Taking Active   Pseudoephedrine-APAP-DM (DAYQUIL PO) Not Taking Active                    SOCIAL HISTORY  Social History     Tobacco Use    Smoking status: Never    Smokeless tobacco: Never   Vaping Use    Vaping Use: Never used   Substance and Sexual Activity    Alcohol use: No    Drug use: No    Sexual activity: Not on file       FAMILY HISTORY  History reviewed. No pertinent family history.       Objective:     VITAL SIGNS: /62 (BP Location: Right arm, Patient Position: Sitting, BP Cuff Size: Adult)   Pulse 83   Temp 36.8 °C (98.2 °F) (Temporal)   Resp 16   Ht 1.854 m (6' 1\")   Wt 108 kg (239 lb)   SpO2 96%   BMI 31.53 kg/m²     PHYSICAL EXAM  Physical Exam  Constitutional:       Appearance: Normal appearance.   HENT:      Head: Normocephalic and atraumatic.   Eyes:      Conjunctiva/sclera: Conjunctivae normal.   Cardiovascular:      Rate and Rhythm: Normal rate and regular rhythm.      Pulses: Normal pulses.      Heart sounds: Normal heart sounds.   Pulmonary:      Effort: Pulmonary effort is normal.   Musculoskeletal:      Cervical back: Normal range of motion.      Comments: Lumbar exam: No midline tenderness to palpation.  No obvious deformity or step-off.  Lumbar range of motion is slightly limited in all directions at this time due to pain.  Negative SLRs bilaterally.  Lower extremity strength and sensation full and intact.   Neurological:      General: No focal deficit present.      Mental Status: He is alert and oriented to person, place, and time. Mental status is at baseline.     RADIOLOGY RESULTS   DX-SACRUM AND COCCYX 2+    Result Date: 2/1/2023 2/1/2023 9:25 AM HISTORY/REASON FOR EXAM:  Pain Following Trauma. Fell down stairs yesterday, tailbone pain. TECHNIQUE/EXAM DESCRIPTION AND NUMBER OF VIEWS:  3 views of the sacrum and coccyx. COMPARISON: None. FINDINGS: Visualized pelvis is intact. SI joints are symmetric. Sacrum appears intact. No displaced " coccyx fracture.     No displaced fracture of the sacrum or coccyx.         Assessment/Plan:     1. Fall from ground level  DX-SACRUM AND COCCYX 2+    ibuprofen (MOTRIN) 800 MG Tab    cyclobenzaprine (FLEXERIL) 10 mg Tab      2. Coccyx contusion, initial encounter  DX-SACRUM AND COCCYX 2+    ibuprofen (MOTRIN) 800 MG Tab    cyclobenzaprine (FLEXERIL) 10 mg Tab      3. Lumbar strain, initial encounter  ibuprofen (MOTRIN) 800 MG Tab    cyclobenzaprine (FLEXERIL) 10 mg Tab          MDM/Comments:    Patient sustained a ground-level fall with coccyx contusion.  X-ray performed without any evidence of fracture or bony abnormality.  Not experiencing any radicular symptoms to the lower extremities.  Normal bowel bladder function.  We will start the patient on a course of ibuprofen 800 mg 3 times daily with food.  Flexeril as needed for muscle pain and spasm.  Discussed sedative effects of this medication.  Encouraged gentle range of motion and stretching exercises.    Differential diagnosis, natural history, supportive care, and indications for immediate follow-up discussed. All questions answered. Patient agrees with the plan of care.    Follow-up as needed if symptoms worsen or fail to improve to PCP, Urgent care or Emergency Room.    I have personally reviewed prior external notes and test results pertinent to today's visit.  I have independently reviewed and interpreted all diagnostics ordered during this urgent care acute visit.   Discussed management options (risks,benefits, and alternatives to treatment). Pt expresses understanding and the treatment plan was agreed upon. Questions were encouraged and answered to pt's satisfaction.    Please note that this dictation was created using voice recognition software. I have made a reasonable attempt to correct obvious errors, but I expect that there are errors of grammar and possibly content that I did not discover before finalizing the note.

## 2023-03-25 ENCOUNTER — OFFICE VISIT (OUTPATIENT)
Dept: URGENT CARE | Facility: PHYSICIAN GROUP | Age: 28
End: 2023-03-25
Payer: COMMERCIAL

## 2023-03-25 VITALS
OXYGEN SATURATION: 98 % | SYSTOLIC BLOOD PRESSURE: 112 MMHG | HEART RATE: 71 BPM | WEIGHT: 235 LBS | DIASTOLIC BLOOD PRESSURE: 68 MMHG | HEIGHT: 73 IN | RESPIRATION RATE: 14 BRPM | TEMPERATURE: 98.5 F | BODY MASS INDEX: 31.14 KG/M2

## 2023-03-25 DIAGNOSIS — H60.12 CELLULITIS OF LEFT EXTERNAL EAR: ICD-10-CM

## 2023-03-25 PROCEDURE — 10060 I&D ABSCESS SIMPLE/SINGLE: CPT | Performed by: NURSE PRACTITIONER

## 2023-03-25 RX ORDER — CIPROFLOXACIN 500 MG/1
500 TABLET, FILM COATED ORAL 2 TIMES DAILY
Qty: 14 TABLET | Refills: 0 | Status: SHIPPED | OUTPATIENT
Start: 2023-03-25 | End: 2023-04-01

## 2023-03-25 RX ORDER — CEPHALEXIN 500 MG/1
500 CAPSULE ORAL 4 TIMES DAILY
Qty: 28 CAPSULE | Refills: 0 | Status: SHIPPED | OUTPATIENT
Start: 2023-03-25 | End: 2023-04-01

## 2023-03-25 ASSESSMENT — VISUAL ACUITY: OU: 1

## 2023-03-25 ASSESSMENT — ENCOUNTER SYMPTOMS
FEVER: 0
CONSTITUTIONAL NEGATIVE: 1
ROS SKIN COMMENTS: PER HPI

## 2023-03-25 NOTE — PROGRESS NOTES
Subjective:     Laz Ortiz is a 28 y.o. male who presents for Otalgia (L ear, swelling, draining, painful, x this morning/)       Otalgia   There is pain in the left ear. This is a new problem. The problem has been gradually worsening. Pertinent negatives include no hearing loss.     Yesterday, patient started to notice discomfort in his left external ear.  Getting worse today.  Has redness, swelling, and tenderness to touch.  Redness and swelling spreading to below his ear.  Noticed some pus/drainage coming from his earlobe which he has been expressing himself.    Review of Systems   Constitutional: Negative.  Negative for fever.   HENT:  Positive for ear pain. Negative for hearing loss.    Skin:         Per HPI   All other systems reviewed and are negative.    Refer to HPI for additional details.    During this visit, appropriate PPE was worn, hand hygiene was performed, and the patient and any visitors were masked.    PMH:  has no past medical history of Diabetes.    MEDS:   Current Outpatient Medications:     ciprofloxacin (CIPRO) 500 MG Tab, Take 1 Tablet by mouth 2 times a day for 7 days., Disp: 14 Tablet, Rfl: 0    cephALEXin (KEFLEX) 500 MG Cap, Take 1 Capsule by mouth 4 times a day for 7 days., Disp: 28 Capsule, Rfl: 0    ibuprofen (MOTRIN) 800 MG Tab, Take 1 Tablet by mouth every 8 hours as needed for Inflammation or Moderate Pain., Disp: 30 Tablet, Rfl: 0    cyclobenzaprine (FLEXERIL) 10 mg Tab, Take 1 Tablet by mouth 3 times a day as needed for Muscle Spasms or Moderate Pain., Disp: 30 Tablet, Rfl: 0    ALLERGIES:   Allergies   Allergen Reactions    Nkda [No Known Drug Allergy]      SURGHX: History reviewed. No pertinent surgical history.  SOCHX:  reports that he has never smoked. He has never used smokeless tobacco. He reports that he does not drink alcohol and does not use drugs.    FH: Per HPI as applicable/pertinent.      Objective:     /68   Pulse 71   Temp 36.9 °C (98.5 °F)   Resp  "14   Ht 1.854 m (6' 1\")   Wt 107 kg (235 lb)   SpO2 98%   BMI 31.00 kg/m²     Physical Exam  Nursing note reviewed.   Constitutional:       General: He is not in acute distress.     Appearance: He is well-developed. He is not ill-appearing or toxic-appearing.   HENT:      Left Ear: Tympanic membrane and ear canal normal. Tympanic membrane is not perforated, erythematous or bulging.      Ears:        Comments: Erythema, warmth, swelling, small dried yellow discharge, possible fluctuance; erythema and swelling spreading to below left ear  Eyes:      General: Vision grossly intact.   Cardiovascular:      Rate and Rhythm: Normal rate.   Pulmonary:      Effort: Pulmonary effort is normal. No respiratory distress.   Musculoskeletal:         General: No deformity. Normal range of motion.   Skin:     Coloration: Skin is not pale.   Neurological:      Mental Status: He is alert and oriented to person, place, and time.      Motor: No weakness.   Psychiatric:         Behavior: Behavior normal. Behavior is cooperative.       Assessment/Plan:     1. Cellulitis of left external ear  - ciprofloxacin (CIPRO) 500 MG Tab; Take 1 Tablet by mouth 2 times a day for 7 days.  Dispense: 14 Tablet; Refill: 0  - cephALEXin (KEFLEX) 500 MG Cap; Take 1 Capsule by mouth 4 times a day for 7 days.  Dispense: 28 Capsule; Refill: 0    Procedure: Incision and Drainage of Left External Ear  - Risks, benefits, and alternatives reviewed.  - Verbal consent received from patient to proceed with incision and drainage.  - Site prepared with Betadine.  - Clean technique with sterile instruments.  - Local anesthesia achieved with 1 mL of 1% lidocaine without epinephrine.  - Small incision with #11 blade scalpel made into fluctuant area. No purulent material expressed.  - Irrigated copiously with NS.  - Minimal bleeding with good hemostasis achieved.  - Non-adhesive dressing applied.  - There were no procedural complications.  - Patient tolerated " procedure well.    Patient advised to monitor for signs of worsening infection including, but not limited to, increased redness, warmth, pain, swelling, discharge, or fever. Basic wound care. Cover with clean dressing as needed. Replace any dressing used at least once every 24 hours or as needed. May apply ice packs, elevate, and take OTC ibuprofen and/or acetaminophen for pain.    Rx as above sent electronically. Cover for Pseudomonas.    Vital signs stable, afebrile, no acute distress at this time. Warning signs reviewed. Return precautions discussed.     Differential diagnosis, natural history, supportive care, over-the-counter symptom management per 's instructions, close monitoring, and indications for immediate follow-up discussed.     All questions answered. Patient agrees with the plan of care.    Discharge summary provided via [employer].

## 2024-04-23 ENCOUNTER — APPOINTMENT (RX ONLY)
Dept: URBAN - METROPOLITAN AREA CLINIC 15 | Facility: CLINIC | Age: 29
Setting detail: DERMATOLOGY
End: 2024-04-23

## 2024-04-23 DIAGNOSIS — L81.4 OTHER MELANIN HYPERPIGMENTATION: ICD-10-CM

## 2024-04-23 DIAGNOSIS — D22 MELANOCYTIC NEVI: ICD-10-CM

## 2024-04-23 DIAGNOSIS — D18.0 HEMANGIOMA: ICD-10-CM

## 2024-04-23 DIAGNOSIS — Z71.89 OTHER SPECIFIED COUNSELING: ICD-10-CM

## 2024-04-23 DIAGNOSIS — D485 NEOPLASM OF UNCERTAIN BEHAVIOR OF SKIN: ICD-10-CM

## 2024-04-23 DIAGNOSIS — L82.1 OTHER SEBORRHEIC KERATOSIS: ICD-10-CM

## 2024-04-23 DIAGNOSIS — Q826 OTHER SPECIFIED ANOMALIES OF SKIN: ICD-10-CM

## 2024-04-23 DIAGNOSIS — Q828 OTHER SPECIFIED ANOMALIES OF SKIN: ICD-10-CM

## 2024-04-23 DIAGNOSIS — Q819 OTHER SPECIFIED ANOMALIES OF SKIN: ICD-10-CM

## 2024-04-23 PROBLEM — D48.5 NEOPLASM OF UNCERTAIN BEHAVIOR OF SKIN: Status: ACTIVE | Noted: 2024-04-23

## 2024-04-23 PROBLEM — D22.5 MELANOCYTIC NEVI OF TRUNK: Status: ACTIVE | Noted: 2024-04-23

## 2024-04-23 PROBLEM — D18.01 HEMANGIOMA OF SKIN AND SUBCUTANEOUS TISSUE: Status: ACTIVE | Noted: 2024-04-23

## 2024-04-23 PROBLEM — D22.61 MELANOCYTIC NEVI OF RIGHT UPPER LIMB, INCLUDING SHOULDER: Status: ACTIVE | Noted: 2024-04-23

## 2024-04-23 PROBLEM — L85.8 OTHER SPECIFIED EPIDERMAL THICKENING: Status: ACTIVE | Noted: 2024-04-23

## 2024-04-23 PROBLEM — D22.62 MELANOCYTIC NEVI OF LEFT UPPER LIMB, INCLUDING SHOULDER: Status: ACTIVE | Noted: 2024-04-23

## 2024-04-23 PROCEDURE — 99203 OFFICE O/P NEW LOW 30 MIN: CPT | Mod: 25

## 2024-04-23 PROCEDURE — ? BIOPSY BY SHAVE METHOD

## 2024-04-23 PROCEDURE — 11102 TANGNTL BX SKIN SINGLE LES: CPT

## 2024-04-23 PROCEDURE — ? COUNSELING

## 2024-04-23 ASSESSMENT — LOCATION SIMPLE DESCRIPTION DERM
LOCATION SIMPLE: LEFT UPPER BACK
LOCATION SIMPLE: RIGHT UPPER BACK
LOCATION SIMPLE: LEFT UPPER ARM
LOCATION SIMPLE: RIGHT LOWER BACK
LOCATION SIMPLE: LEFT CHEEK
LOCATION SIMPLE: RIGHT UPPER ARM
LOCATION SIMPLE: CHEST

## 2024-04-23 ASSESSMENT — LOCATION DETAILED DESCRIPTION DERM
LOCATION DETAILED: RIGHT MEDIAL INFERIOR CHEST
LOCATION DETAILED: LEFT CENTRAL MALAR CHEEK
LOCATION DETAILED: LEFT ANTERIOR DISTAL UPPER ARM
LOCATION DETAILED: RIGHT MEDIAL SUPERIOR CHEST
LOCATION DETAILED: LEFT INFERIOR UPPER BACK
LOCATION DETAILED: RIGHT LATERAL UPPER BACK
LOCATION DETAILED: RIGHT PROXIMAL POSTERIOR UPPER ARM
LOCATION DETAILED: RIGHT SUPERIOR LATERAL MIDBACK
LOCATION DETAILED: RIGHT ANTERIOR DISTAL UPPER ARM

## 2024-04-23 ASSESSMENT — LOCATION ZONE DERM
LOCATION ZONE: ARM
LOCATION ZONE: FACE
LOCATION ZONE: TRUNK

## 2024-04-23 NOTE — PROCEDURE: BIOPSY BY SHAVE METHOD
Detail Level: Detailed
Depth Of Biopsy: dermis
Was A Bandage Applied: Yes
Size Of Lesion In Cm: 0.6
X Size Of Lesion In Cm: 0.4
Biopsy Type: H and E
Biopsy Method: Dermablade
Anesthesia Type: 1% lidocaine with epinephrine
Anesthesia Volume In Cc: 0.5
Additional Anesthesia Volume In Cc (Will Not Render If 0): 0
Hemostasis: Drysol
Wound Care: Petrolatum
Dressing: bandage
Destruction After The Procedure: No
Type Of Destruction Used: Curettage
Curettage Text: The wound bed was treated with curettage after the biopsy was performed.
Cryotherapy Text: The wound bed was treated with cryotherapy after the biopsy was performed.
Electrodesiccation Text: The wound bed was treated with electrodesiccation after the biopsy was performed.
Electrodesiccation And Curettage Text: The wound bed was treated with electrodesiccation and curettage after the biopsy was performed.
Silver Nitrate Text: The wound bed was treated with silver nitrate after the biopsy was performed.
Lab: [id number]
Lab Facility: 
Consent: Written consent was obtained and risks were reviewed including but not limited to scarring, infection, bleeding, scabbing, incomplete removal, nerve damage and allergy to anesthesia.
Post-Care Instructions: I reviewed with the patient in detail post-care instructions. Patient is to keep the biopsy site dry overnight, and then apply bacitracin twice daily until healed. Patient may apply hydrogen peroxide soaks to remove any crusting.
Notification Instructions: Patient will be notified of biopsy results. However, patient instructed to call the office if not contacted within 2 weeks.
Billing Type: Third-Party Bill
Information: Selecting Yes will display possible errors in your note based on the variables you have selected. This validation is only offered as a suggestion for you. PLEASE NOTE THAT THE VALIDATION TEXT WILL BE REMOVED WHEN YOU FINALIZE YOUR NOTE. IF YOU WANT TO FAX A PRELIMINARY NOTE YOU WILL NEED TO TOGGLE THIS TO 'NO' IF YOU DO NOT WANT IT IN YOUR FAXED NOTE.

## 2024-05-16 ENCOUNTER — OFFICE VISIT (OUTPATIENT)
Dept: URGENT CARE | Facility: PHYSICIAN GROUP | Age: 29
End: 2024-05-16
Payer: COMMERCIAL

## 2024-05-16 VITALS
HEIGHT: 73 IN | DIASTOLIC BLOOD PRESSURE: 72 MMHG | SYSTOLIC BLOOD PRESSURE: 118 MMHG | BODY MASS INDEX: 31.49 KG/M2 | HEART RATE: 78 BPM | RESPIRATION RATE: 12 BRPM | OXYGEN SATURATION: 96 % | WEIGHT: 237.6 LBS | TEMPERATURE: 98.1 F

## 2024-05-16 DIAGNOSIS — J06.9 URI WITH COUGH AND CONGESTION: ICD-10-CM

## 2024-05-16 LAB — S PYO DNA SPEC NAA+PROBE: NOT DETECTED

## 2024-05-16 PROCEDURE — 3074F SYST BP LT 130 MM HG: CPT | Performed by: NURSE PRACTITIONER

## 2024-05-16 PROCEDURE — 87651 STREP A DNA AMP PROBE: CPT | Performed by: NURSE PRACTITIONER

## 2024-05-16 PROCEDURE — 3078F DIAST BP <80 MM HG: CPT | Performed by: NURSE PRACTITIONER

## 2024-05-16 PROCEDURE — 99213 OFFICE O/P EST LOW 20 MIN: CPT | Performed by: NURSE PRACTITIONER

## 2024-05-16 RX ORDER — FLUTICASONE PROPIONATE 50 MCG
1 SPRAY, SUSPENSION (ML) NASAL DAILY
Qty: 16 G | Refills: 0 | Status: SHIPPED | OUTPATIENT
Start: 2024-05-16

## 2024-05-16 RX ORDER — METHYLPREDNISOLONE 4 MG/1
TABLET ORAL
Qty: 21 TABLET | Refills: 0 | Status: SHIPPED | OUTPATIENT
Start: 2024-05-16

## 2024-05-16 ASSESSMENT — ENCOUNTER SYMPTOMS
HEADACHES: 0
SHORTNESS OF BREATH: 1
COUGH: 1
VOMITING: 0
SPUTUM PRODUCTION: 0
DIARRHEA: 0
WHEEZING: 1
HEMOPTYSIS: 0
FEVER: 0
NAUSEA: 0
SORE THROAT: 0
ABDOMINAL PAIN: 0
CHILLS: 0

## 2024-05-16 NOTE — PROGRESS NOTES
Subjective:     Laz Ortiz is a 29 y.o. male who presents for Cough (X1 day), Congestion (X1 day), and Shortness of Breath (Feels like his lungs are tight)      Cough  This is a new problem. The current episode started yesterday (Laz is a pleasant 29 year old male who presents to  today with complaints of chest tightness, SOB, dry cough). Associated symptoms include nasal congestion, shortness of breath and wheezing. Pertinent negatives include no chills, fever, headaches, hemoptysis, postnasal drip or sore throat. He has tried rest for the symptoms. There is no history of asthma.   Shortness of Breath  Associated symptoms include wheezing. Pertinent negatives include no abdominal pain, fever, headaches, hemoptysis, sore throat, sputum production or vomiting. There is no history of asthma.     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     Review of Systems   Constitutional:  Negative for chills, fever and malaise/fatigue.   HENT:  Positive for congestion. Negative for postnasal drip and sore throat.    Respiratory:  Positive for cough, shortness of breath and wheezing. Negative for hemoptysis and sputum production.    Gastrointestinal:  Negative for abdominal pain, diarrhea, nausea and vomiting.   Neurological:  Negative for headaches.       PMH: No past medical history on file.  ALLERGIES:   Allergies   Allergen Reactions    Nkda [No Known Drug Allergy]      SURGHX: No past surgical history on file.  SOCHX:   Social History     Socioeconomic History    Marital status: Single   Tobacco Use    Smoking status: Never    Smokeless tobacco: Never   Vaping Use    Vaping status: Never Used   Substance and Sexual Activity    Alcohol use: No    Drug use: No     FH: No family history on file.      Objective:   /72 (BP Location: Left arm, Patient Position: Sitting, BP Cuff Size: Adult long)   Pulse 78   Temp 36.7 °C (98.1 °F)   Resp  "12   Ht 1.854 m (6' 1\")   Wt 108 kg (237 lb 9.6 oz)   SpO2 96%   BMI 31.35 kg/m²     Physical Exam  Vitals and nursing note reviewed.   Constitutional:       General: He is not in acute distress.     Appearance: Normal appearance. He is normal weight. He is not ill-appearing or toxic-appearing.   HENT:      Head: Normocephalic.      Right Ear: Tympanic membrane, ear canal and external ear normal. There is no impacted cerumen.      Left Ear: Tympanic membrane, ear canal and external ear normal. There is no impacted cerumen.      Nose: Congestion present. No rhinorrhea.      Mouth/Throat:      Mouth: Mucous membranes are moist.      Pharynx: Posterior oropharyngeal erythema present. No oropharyngeal exudate.   Eyes:      General:         Right eye: No discharge.         Left eye: No discharge.      Extraocular Movements: Extraocular movements intact.      Conjunctiva/sclera: Conjunctivae normal.      Pupils: Pupils are equal, round, and reactive to light.   Cardiovascular:      Rate and Rhythm: Normal rate and regular rhythm.      Pulses: Normal pulses.      Heart sounds: Normal heart sounds.   Pulmonary:      Effort: Pulmonary effort is normal. No respiratory distress.      Breath sounds: No stridor. Examination of the right-lower field reveals wheezing. Wheezing present. No rhonchi or rales.   Chest:      Chest wall: No tenderness.   Abdominal:      General: Abdomen is flat. There is no distension.      Palpations: There is no mass.      Tenderness: There is no abdominal tenderness. There is no right CVA tenderness, left CVA tenderness, guarding or rebound.      Hernia: No hernia is present.   Musculoskeletal:         General: Normal range of motion.      Cervical back: Normal range of motion and neck supple. No rigidity.   Lymphadenopathy:      Cervical: No cervical adenopathy.   Skin:     General: Skin is warm and dry.   Neurological:      General: No focal deficit present.      Mental Status: He is alert and " oriented to person, place, and time. Mental status is at baseline.   Psychiatric:         Mood and Affect: Mood normal.         Behavior: Behavior normal.         Judgment: Judgment normal.     Results for orders placed or performed in visit on 05/16/24   POCT CEPHEID GROUP A STREP - PCR   Result Value Ref Range    POC Group A Strep, PCR Not Detected Not Detected, Invalid       Assessment/Plan:   Assessment    1. URI with cough and congestion  POCT CoV-2, Flu A/B, RSV by PCR    POCT CEPHEID GROUP A STREP - PCR      Low concern for Hanta virus despite exposure to mouse droppings.   We discussed supportive measures including humidifier, warm salt water gargles, over-the-counter Cepacol throat lozenges, rest  and increased fluids. Pt was encouraged to seek treatment back in the ER or urgent care for worsening symptoms,  fever greater than 100.5, wheezes or shortness of breath.     Pt educated on red flags and when to seek treatment back in ER or UC.

## 2024-05-16 NOTE — LETTER
May 16, 2024    To Whom It May Concern:         This is confirmation that Laz Ortiz attended his scheduled appointment with JENNIFER Carson on 5/16/24. Please excuse his absence through 5/18/2024 due to an acute illness. He may return to work sooner if better.          If you have any questions please do not hesitate to call me at the phone number listed below.    Sincerely,          CHRISTIANE Carson.  [phone]

## 2025-01-13 SDOH — ECONOMIC STABILITY: HOUSING INSECURITY
IN THE LAST 12 MONTHS, WAS THERE A TIME WHEN YOU DID NOT HAVE A STEADY PLACE TO SLEEP OR SLEPT IN A SHELTER (INCLUDING NOW)?: NO

## 2025-01-13 SDOH — HEALTH STABILITY: PHYSICAL HEALTH: ON AVERAGE, HOW MANY DAYS PER WEEK DO YOU ENGAGE IN MODERATE TO STRENUOUS EXERCISE (LIKE A BRISK WALK)?: 6 DAYS

## 2025-01-13 SDOH — ECONOMIC STABILITY: INCOME INSECURITY: IN THE LAST 12 MONTHS, WAS THERE A TIME WHEN YOU WERE NOT ABLE TO PAY THE MORTGAGE OR RENT ON TIME?: NO

## 2025-01-13 SDOH — ECONOMIC STABILITY: FOOD INSECURITY: WITHIN THE PAST 12 MONTHS, YOU WORRIED THAT YOUR FOOD WOULD RUN OUT BEFORE YOU GOT MONEY TO BUY MORE.: NEVER TRUE

## 2025-01-13 SDOH — HEALTH STABILITY: PHYSICAL HEALTH: ON AVERAGE, HOW MANY MINUTES DO YOU ENGAGE IN EXERCISE AT THIS LEVEL?: 120 MIN

## 2025-01-13 SDOH — ECONOMIC STABILITY: FOOD INSECURITY: WITHIN THE PAST 12 MONTHS, THE FOOD YOU BOUGHT JUST DIDN'T LAST AND YOU DIDN'T HAVE MONEY TO GET MORE.: NEVER TRUE

## 2025-01-13 SDOH — ECONOMIC STABILITY: INCOME INSECURITY: HOW HARD IS IT FOR YOU TO PAY FOR THE VERY BASICS LIKE FOOD, HOUSING, MEDICAL CARE, AND HEATING?: NOT VERY HARD

## 2025-01-13 SDOH — HEALTH STABILITY: MENTAL HEALTH
STRESS IS WHEN SOMEONE FEELS TENSE, NERVOUS, ANXIOUS, OR CAN'T SLEEP AT NIGHT BECAUSE THEIR MIND IS TROUBLED. HOW STRESSED ARE YOU?: PATIENT DECLINED

## 2025-01-13 SDOH — ECONOMIC STABILITY: TRANSPORTATION INSECURITY
IN THE PAST 12 MONTHS, HAS LACK OF RELIABLE TRANSPORTATION KEPT YOU FROM MEDICAL APPOINTMENTS, MEETINGS, WORK OR FROM GETTING THINGS NEEDED FOR DAILY LIVING?: NO

## 2025-01-13 SDOH — ECONOMIC STABILITY: TRANSPORTATION INSECURITY
IN THE PAST 12 MONTHS, HAS LACK OF TRANSPORTATION KEPT YOU FROM MEETINGS, WORK, OR FROM GETTING THINGS NEEDED FOR DAILY LIVING?: NO

## 2025-01-13 SDOH — ECONOMIC STABILITY: TRANSPORTATION INSECURITY
IN THE PAST 12 MONTHS, HAS THE LACK OF TRANSPORTATION KEPT YOU FROM MEDICAL APPOINTMENTS OR FROM GETTING MEDICATIONS?: NO

## 2025-01-13 ASSESSMENT — LIFESTYLE VARIABLES
HOW OFTEN DO YOU HAVE A DRINK CONTAINING ALCOHOL: NEVER
HOW OFTEN DO YOU HAVE SIX OR MORE DRINKS ON ONE OCCASION: NEVER
AUDIT-C TOTAL SCORE: 0
HOW MANY STANDARD DRINKS CONTAINING ALCOHOL DO YOU HAVE ON A TYPICAL DAY: PATIENT DOES NOT DRINK
SKIP TO QUESTIONS 9-10: 1

## 2025-01-13 ASSESSMENT — SOCIAL DETERMINANTS OF HEALTH (SDOH)
HOW OFTEN DO YOU HAVE SIX OR MORE DRINKS ON ONE OCCASION: NEVER
WITHIN THE PAST 12 MONTHS, YOU WORRIED THAT YOUR FOOD WOULD RUN OUT BEFORE YOU GOT THE MONEY TO BUY MORE: NEVER TRUE
HOW OFTEN DO YOU ATTENT MEETINGS OF THE CLUB OR ORGANIZATION YOU BELONG TO?: NEVER
DO YOU BELONG TO ANY CLUBS OR ORGANIZATIONS SUCH AS CHURCH GROUPS UNIONS, FRATERNAL OR ATHLETIC GROUPS, OR SCHOOL GROUPS?: NO
HOW OFTEN DO YOU GET TOGETHER WITH FRIENDS OR RELATIVES?: ONCE A WEEK
HOW OFTEN DO YOU ATTEND CHURCH OR RELIGIOUS SERVICES?: NEVER
HOW OFTEN DO YOU ATTENT MEETINGS OF THE CLUB OR ORGANIZATION YOU BELONG TO?: NEVER
HOW HARD IS IT FOR YOU TO PAY FOR THE VERY BASICS LIKE FOOD, HOUSING, MEDICAL CARE, AND HEATING?: NOT VERY HARD
IN THE PAST 12 MONTHS, HAS THE ELECTRIC, GAS, OIL, OR WATER COMPANY THREATENED TO SHUT OFF SERVICE IN YOUR HOME?: NO
DO YOU BELONG TO ANY CLUBS OR ORGANIZATIONS SUCH AS CHURCH GROUPS UNIONS, FRATERNAL OR ATHLETIC GROUPS, OR SCHOOL GROUPS?: NO
HOW OFTEN DO YOU HAVE A DRINK CONTAINING ALCOHOL: NEVER
HOW OFTEN DO YOU ATTEND CHURCH OR RELIGIOUS SERVICES?: NEVER
HOW MANY DRINKS CONTAINING ALCOHOL DO YOU HAVE ON A TYPICAL DAY WHEN YOU ARE DRINKING: PATIENT DOES NOT DRINK
HOW OFTEN DO YOU GET TOGETHER WITH FRIENDS OR RELATIVES?: ONCE A WEEK

## 2025-01-15 ENCOUNTER — OFFICE VISIT (OUTPATIENT)
Dept: MEDICAL GROUP | Facility: PHYSICIAN GROUP | Age: 30
End: 2025-01-15
Payer: COMMERCIAL

## 2025-01-15 VITALS
TEMPERATURE: 98.2 F | BODY MASS INDEX: 32.47 KG/M2 | SYSTOLIC BLOOD PRESSURE: 122 MMHG | WEIGHT: 245 LBS | HEART RATE: 90 BPM | DIASTOLIC BLOOD PRESSURE: 70 MMHG | HEIGHT: 73 IN | OXYGEN SATURATION: 95 %

## 2025-01-15 DIAGNOSIS — E66.9 OBESITY (BMI 30-39.9): ICD-10-CM

## 2025-01-15 DIAGNOSIS — Z11.59 NEED FOR HEPATITIS C SCREENING TEST: ICD-10-CM

## 2025-01-15 DIAGNOSIS — L65.9 HAIR LOSS: ICD-10-CM

## 2025-01-15 DIAGNOSIS — Z13.29 SCREENING FOR ENDOCRINE, METABOLIC AND IMMUNITY DISORDER: ICD-10-CM

## 2025-01-15 DIAGNOSIS — Z76.89 ENCOUNTER TO ESTABLISH CARE: ICD-10-CM

## 2025-01-15 DIAGNOSIS — Z13.228 SCREENING FOR ENDOCRINE, METABOLIC AND IMMUNITY DISORDER: ICD-10-CM

## 2025-01-15 DIAGNOSIS — Z00.00 PREVENTATIVE HEALTH CARE: ICD-10-CM

## 2025-01-15 DIAGNOSIS — J30.2 SEASONAL ALLERGIES: ICD-10-CM

## 2025-01-15 DIAGNOSIS — Z11.4 SCREENING FOR HIV (HUMAN IMMUNODEFICIENCY VIRUS): ICD-10-CM

## 2025-01-15 DIAGNOSIS — Z01.84 IMMUNITY STATUS TESTING: ICD-10-CM

## 2025-01-15 DIAGNOSIS — Z13.0 SCREENING FOR ENDOCRINE, METABOLIC AND IMMUNITY DISORDER: ICD-10-CM

## 2025-01-15 PROCEDURE — 99214 OFFICE O/P EST MOD 30 MIN: CPT | Performed by: NURSE PRACTITIONER

## 2025-01-15 PROCEDURE — 3078F DIAST BP <80 MM HG: CPT | Performed by: NURSE PRACTITIONER

## 2025-01-15 PROCEDURE — 3074F SYST BP LT 130 MM HG: CPT | Performed by: NURSE PRACTITIONER

## 2025-01-15 ASSESSMENT — PATIENT HEALTH QUESTIONNAIRE - PHQ9: CLINICAL INTERPRETATION OF PHQ2 SCORE: 0

## 2025-01-15 NOTE — ASSESSMENT & PLAN NOTE
Hair loss started at age 19. Top of head is worse. Worsened in last 5 years. He spoke with family friend who has had similar symptoms that started testosterone replacement therapy and has had improvement in symptoms. He does feel tired, has difficulty losing weight. He is requesting testosterone level.

## 2025-01-15 NOTE — PROGRESS NOTES
Subjective:     CC:  Diagnoses of Encounter to establish care, Hair loss, Seasonal allergies, Obesity (BMI 30-39.9), Preventative health care, Screening for endocrine, metabolic and immunity disorder, Screening for HIV (human immunodeficiency virus), Need for hepatitis C screening test, and Immunity status testing were pertinent to this visit.    HISTORY OF THE PRESENT ILLNESS: Patient is a 30 y.o. male. This pleasant patient is here today to establish care and discuss the following. His prior PCP was 10 years ago.      Seasonal allergies  Using OTC Allegra. Will use Flonase as needed. 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     Hair loss  Hair loss started at age 19. Top of head is worse. Worsened in last 5 years. He spoke with family friend who has had similar symptoms that started testosterone replacement therapy and has had improvement in symptoms. He does feel tired, has difficulty losing weight. He is requesting testosterone level.     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 Currently building [employer]. He tries to go to the gym in the morning.  He is active on the weekend.      Allergies: Nkda [no known drug allergy]    Current Outpatient Medications Ordered in Epic   Medication Sig Dispense Refill    fluticasone (FLONASE) 50 MCG/ACT nasal spray Administer 1 Spray into affected nostril(S) every day. 16 g 0     No current Epic-ordered facility-administered medications on file.       History reviewed. No pertinent past medical history.    History reviewed. No pertinent surgical history.    Social History     Tobacco Use    Smoking status: Never    Smokeless tobacco: Never   Vaping Use    Vaping status: Never Used   Substance Use Topics    Alcohol use: No    Drug use: No       Social History     Social History Narrative    Not on file       Family History  "  Problem Relation Age of Onset    No Known Problems Mother     Hypertension Father     No Known Problems Sister     No Known Problems Sister     No Known Problems Brother     No Known Problems Brother     No Known Problems Brother     Other Maternal Grandmother         +smoker    Heart Attack Paternal Grandmother     Heart Attack Paternal Grandfather     No Known Problems Daughter        Health Maintenance: Reviewed.         Objective:     Vital signs reviewed   Exam: /70 (BP Location: Right arm, Patient Position: Sitting, BP Cuff Size: Adult)   Pulse 90   Temp 36.8 °C (98.2 °F) (Temporal)   Ht 1.854 m (6' 1\")   Wt 111 kg (245 lb)   SpO2 95%  Body mass index is 32.32 kg/m².    Gen: Alert and oriented, No apparent distress.  Head:   top of head without hair  Neck: Neck is supple without lymphadenopathy.  Lungs: Normal effort, CTA bilaterally, no wheezes, rhonchi, or rales.    CV: Regular rate and rhythm. No murmurs, rubs, or gallops.  Ext: No clubbing, cyanosis, edema      Assessment & Plan:   30 y.o. male with the following -    1. Encounter to establish care  Acute uncomplicated problem.  Care established.    2. Hair loss  Chronic exacerbated problem.  Check labs for anemia, iron deficiency, thyroid dysfunction and low testosterone.  Referral to dermatology.  We discussed that if his testosterone level is low we will have him repeat the level again.  If testosterone is low consider referral to urology.  - CBC WITH DIFFERENTIAL; Future  - TSH WITH REFLEX TO FT4; Future  - IRON/TOTAL IRON BIND; Future  - FERRITIN; Future  - Testosterone, Free & Total, Adult Male (w/SHBG); Future  - Referral to Dermatology    3. Seasonal allergies  Chronic stable problem.  Continue over-the-counter Allegra and Flonase nasal spray as needed.    4. Obesity (BMI 30-39.9)  Chronic exacerbated problem.  Continue with healthy diet and exercise.  Checking labs as above, see #2.  - Patient identified as having weight management " issue.  Appropriate orders and counseling given.    5. Preventative health care  Acute uncomplicated problem.  Annual labs ordered.  - CBC WITH DIFFERENTIAL; Future  - Comp Metabolic Panel; Future  - Lipid Profile; Future    6. Screening for endocrine, metabolic and immunity disorder  Acute uncomplicated problem.  Discussed screening labs and he is in agreement.  - TSH WITH REFLEX TO FT4; Future  - VITAMIN D,25 HYDROXY (DEFICIENCY); Future    7. Screening for HIV (human immunodeficiency virus)  Acute uncomplicated problem.  Discussed screening and he is in agreement.  - HIV AG/AB COMBO ASSAY SCREENING; Future    8. Need for hepatitis C screening test  Acute uncomplicated problem.  Discussed screening and he is in agreement.  - HEP C VIRUS ANTIBODY; Future    9. Immunity status testing  Acute uncomplicated problem.  Discussed checking immunity and he is in agreement.  - HEP B SURFACE AB; Future      Return in about 1 year (around 1/15/2026) for annual.    Please note that this dictation was created using voice recognition software. I have made every reasonable attempt to correct obvious errors, but I expect that there are errors of grammar and possibly content that I did not discover before finalizing the note.

## 2025-01-15 NOTE — ASSESSMENT & PLAN NOTE
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 Currently building [employer]. He tries to go to the gym in the morning.  He is active on the weekend.

## 2025-01-15 NOTE — ASSESSMENT & PLAN NOTE
Using OTC Allegra. Will use Flonase as needed. 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

## 2025-01-25 ENCOUNTER — HOSPITAL ENCOUNTER (OUTPATIENT)
Dept: LAB | Facility: MEDICAL CENTER | Age: 30
End: 2025-01-25
Attending: NURSE PRACTITIONER
Payer: COMMERCIAL

## 2025-01-25 DIAGNOSIS — Z01.84 IMMUNITY STATUS TESTING: ICD-10-CM

## 2025-01-25 DIAGNOSIS — Z13.228 SCREENING FOR ENDOCRINE, METABOLIC AND IMMUNITY DISORDER: ICD-10-CM

## 2025-01-25 DIAGNOSIS — Z11.59 NEED FOR HEPATITIS C SCREENING TEST: ICD-10-CM

## 2025-01-25 DIAGNOSIS — L65.9 HAIR LOSS: ICD-10-CM

## 2025-01-25 DIAGNOSIS — Z00.00 PREVENTATIVE HEALTH CARE: ICD-10-CM

## 2025-01-25 DIAGNOSIS — Z13.0 SCREENING FOR ENDOCRINE, METABOLIC AND IMMUNITY DISORDER: ICD-10-CM

## 2025-01-25 DIAGNOSIS — Z11.4 SCREENING FOR HIV (HUMAN IMMUNODEFICIENCY VIRUS): ICD-10-CM

## 2025-01-25 DIAGNOSIS — Z13.29 SCREENING FOR ENDOCRINE, METABOLIC AND IMMUNITY DISORDER: ICD-10-CM

## 2025-01-25 LAB
25(OH)D3 SERPL-MCNC: 22 NG/ML (ref 30–100)
ALBUMIN SERPL BCP-MCNC: 4.5 G/DL (ref 3.2–4.9)
ALBUMIN/GLOB SERPL: 1.6 G/DL
ALP SERPL-CCNC: 50 U/L (ref 30–99)
ALT SERPL-CCNC: 35 U/L (ref 2–50)
ANION GAP SERPL CALC-SCNC: 10 MMOL/L (ref 7–16)
AST SERPL-CCNC: 25 U/L (ref 12–45)
BASOPHILS # BLD AUTO: 0.7 % (ref 0–1.8)
BASOPHILS # BLD: 0.06 K/UL (ref 0–0.12)
BILIRUB SERPL-MCNC: 1.4 MG/DL (ref 0.1–1.5)
BUN SERPL-MCNC: 17 MG/DL (ref 8–22)
CALCIUM ALBUM COR SERPL-MCNC: 9.1 MG/DL (ref 8.5–10.5)
CALCIUM SERPL-MCNC: 9.5 MG/DL (ref 8.5–10.5)
CHLORIDE SERPL-SCNC: 107 MMOL/L (ref 96–112)
CHOLEST SERPL-MCNC: 148 MG/DL (ref 100–199)
CO2 SERPL-SCNC: 23 MMOL/L (ref 20–33)
CREAT SERPL-MCNC: 0.93 MG/DL (ref 0.5–1.4)
EOSINOPHIL # BLD AUTO: 0.55 K/UL (ref 0–0.51)
EOSINOPHIL NFR BLD: 6.4 % (ref 0–6.9)
ERYTHROCYTE [DISTWIDTH] IN BLOOD BY AUTOMATED COUNT: 40.3 FL (ref 35.9–50)
FASTING STATUS PATIENT QL REPORTED: NORMAL
FERRITIN SERPL-MCNC: 426 NG/ML (ref 22–322)
GFR SERPLBLD CREATININE-BSD FMLA CKD-EPI: 113 ML/MIN/1.73 M 2
GLOBULIN SER CALC-MCNC: 2.8 G/DL (ref 1.9–3.5)
GLUCOSE SERPL-MCNC: 91 MG/DL (ref 65–99)
HBV SURFACE AB SERPL IA-ACNC: <3.5 MIU/ML (ref 0–10)
HCT VFR BLD AUTO: 45.2 % (ref 42–52)
HCV AB SER QL: NORMAL
HDLC SERPL-MCNC: 36 MG/DL
HGB BLD-MCNC: 16.2 G/DL (ref 14–18)
HIV 1+2 AB+HIV1 P24 AG SERPL QL IA: NORMAL
IMM GRANULOCYTES # BLD AUTO: 0.03 K/UL (ref 0–0.11)
IMM GRANULOCYTES NFR BLD AUTO: 0.3 % (ref 0–0.9)
IRON SATN MFR SERPL: 50 % (ref 15–55)
IRON SERPL-MCNC: 170 UG/DL (ref 50–180)
LDLC SERPL CALC-MCNC: 98 MG/DL
LYMPHOCYTES # BLD AUTO: 2.08 K/UL (ref 1–4.8)
LYMPHOCYTES NFR BLD: 24 % (ref 22–41)
MCH RBC QN AUTO: 31.3 PG (ref 27–33)
MCHC RBC AUTO-ENTMCNC: 35.8 G/DL (ref 32.3–36.5)
MCV RBC AUTO: 87.4 FL (ref 81.4–97.8)
MONOCYTES # BLD AUTO: 0.65 K/UL (ref 0–0.85)
MONOCYTES NFR BLD AUTO: 7.5 % (ref 0–13.4)
NEUTROPHILS # BLD AUTO: 5.28 K/UL (ref 1.82–7.42)
NEUTROPHILS NFR BLD: 61.1 % (ref 44–72)
NRBC # BLD AUTO: 0 K/UL
NRBC BLD-RTO: 0 /100 WBC (ref 0–0.2)
PLATELET # BLD AUTO: 414 K/UL (ref 164–446)
PMV BLD AUTO: 9.5 FL (ref 9–12.9)
POTASSIUM SERPL-SCNC: 4.4 MMOL/L (ref 3.6–5.5)
PROT SERPL-MCNC: 7.3 G/DL (ref 6–8.2)
RBC # BLD AUTO: 5.17 M/UL (ref 4.7–6.1)
SODIUM SERPL-SCNC: 140 MMOL/L (ref 135–145)
TIBC SERPL-MCNC: 341 UG/DL (ref 250–450)
TRIGL SERPL-MCNC: 69 MG/DL (ref 0–149)
TSH SERPL DL<=0.005 MIU/L-ACNC: 1.31 UIU/ML (ref 0.38–5.33)
UIBC SERPL-MCNC: 171 UG/DL (ref 110–370)
WBC # BLD AUTO: 8.7 K/UL (ref 4.8–10.8)

## 2025-01-25 PROCEDURE — 83540 ASSAY OF IRON: CPT

## 2025-01-25 PROCEDURE — 80053 COMPREHEN METABOLIC PANEL: CPT

## 2025-01-25 PROCEDURE — 86706 HEP B SURFACE ANTIBODY: CPT

## 2025-01-25 PROCEDURE — 82306 VITAMIN D 25 HYDROXY: CPT

## 2025-01-25 PROCEDURE — 83550 IRON BINDING TEST: CPT

## 2025-01-25 PROCEDURE — 36415 COLL VENOUS BLD VENIPUNCTURE: CPT

## 2025-01-25 PROCEDURE — 84403 ASSAY OF TOTAL TESTOSTERONE: CPT

## 2025-01-25 PROCEDURE — 84270 ASSAY OF SEX HORMONE GLOBUL: CPT

## 2025-01-25 PROCEDURE — 84443 ASSAY THYROID STIM HORMONE: CPT

## 2025-01-25 PROCEDURE — 85025 COMPLETE CBC W/AUTO DIFF WBC: CPT

## 2025-01-25 PROCEDURE — 80061 LIPID PANEL: CPT

## 2025-01-25 PROCEDURE — 86803 HEPATITIS C AB TEST: CPT

## 2025-01-25 PROCEDURE — 84402 ASSAY OF FREE TESTOSTERONE: CPT

## 2025-01-25 PROCEDURE — 87389 HIV-1 AG W/HIV-1&-2 AB AG IA: CPT

## 2025-01-25 PROCEDURE — 82728 ASSAY OF FERRITIN: CPT

## 2025-01-27 LAB
SHBG SERPL-SCNC: 23 NMOL/L (ref 17–56)
TESTOST FREE MFR SERPL: 2.2 % (ref 1.6–2.9)
TESTOST FREE SERPL-MCNC: 84 PG/ML (ref 47–244)
TESTOST SERPL-MCNC: 384 NG/DL (ref 300–1080)

## 2025-01-29 ENCOUNTER — PATIENT MESSAGE (OUTPATIENT)
Dept: MEDICAL GROUP | Facility: PHYSICIAN GROUP | Age: 30
End: 2025-01-29
Payer: COMMERCIAL

## 2025-01-29 DIAGNOSIS — R79.89 ELEVATED FERRITIN LEVEL: ICD-10-CM

## 2025-02-05 NOTE — PROGRESS NOTES
Elevated ferritin with recent labs and denies any family history of hemochromatosis.  Check ferritin and hemochromatosis lab in 1 month.